# Patient Record
Sex: FEMALE | Race: OTHER | NOT HISPANIC OR LATINO | URBAN - METROPOLITAN AREA
[De-identification: names, ages, dates, MRNs, and addresses within clinical notes are randomized per-mention and may not be internally consistent; named-entity substitution may affect disease eponyms.]

---

## 2018-08-17 ENCOUNTER — EMERGENCY (EMERGENCY)
Facility: HOSPITAL | Age: 28
LOS: 1 days | Discharge: HOME | End: 2018-08-17
Attending: EMERGENCY MEDICINE
Payer: SELF-PAY

## 2018-08-17 VITALS
RESPIRATION RATE: 14 BRPM | HEART RATE: 102 BPM | TEMPERATURE: 98 F | WEIGHT: 149.91 LBS | HEIGHT: 62 IN | OXYGEN SATURATION: 100 % | DIASTOLIC BLOOD PRESSURE: 74 MMHG | SYSTOLIC BLOOD PRESSURE: 113 MMHG

## 2018-08-17 DIAGNOSIS — O26.91 PREGNANCY RELATED CONDITIONS, UNSPECIFIED, FIRST TRIMESTER: ICD-10-CM

## 2018-08-17 LAB
ANION GAP SERPL CALC-SCNC: 9 MMOL/L — SIGNIFICANT CHANGE UP (ref 5–17)
APPEARANCE UR: CLEAR — SIGNIFICANT CHANGE UP
BASOPHILS # BLD AUTO: 0.02 K/UL — SIGNIFICANT CHANGE UP (ref 0–0.2)
BASOPHILS NFR BLD AUTO: 0.3 % — SIGNIFICANT CHANGE UP (ref 0–2)
BILIRUB UR-MCNC: NEGATIVE — SIGNIFICANT CHANGE UP
BUN SERPL-MCNC: 5 MG/DL — LOW (ref 7–23)
CALCIUM SERPL-MCNC: 8.6 MG/DL — SIGNIFICANT CHANGE UP (ref 8.5–10.1)
CHLORIDE SERPL-SCNC: 109 MMOL/L — HIGH (ref 96–108)
CO2 SERPL-SCNC: 21 MMOL/L — LOW (ref 22–31)
COLOR SPEC: YELLOW — SIGNIFICANT CHANGE UP
CREAT SERPL-MCNC: 0.69 MG/DL — SIGNIFICANT CHANGE UP (ref 0.5–1.3)
DIFF PNL FLD: ABNORMAL
EOSINOPHIL # BLD AUTO: 0.02 K/UL — SIGNIFICANT CHANGE UP (ref 0–0.5)
EOSINOPHIL NFR BLD AUTO: 0.3 % — SIGNIFICANT CHANGE UP (ref 0–6)
GLUCOSE SERPL-MCNC: 96 MG/DL — SIGNIFICANT CHANGE UP (ref 70–99)
GLUCOSE UR QL: NEGATIVE — SIGNIFICANT CHANGE UP
HCG SERPL-ACNC: 4557 MIU/ML — HIGH
HCG UR QL: POSITIVE
HCT VFR BLD CALC: 36 % — SIGNIFICANT CHANGE UP (ref 34.5–45)
HCT VFR BLD CALC: 39.9 % — SIGNIFICANT CHANGE UP (ref 34.5–45)
HGB BLD-MCNC: 12.5 G/DL — SIGNIFICANT CHANGE UP (ref 11.5–15.5)
HGB BLD-MCNC: 14 G/DL — SIGNIFICANT CHANGE UP (ref 11.5–15.5)
IMM GRANULOCYTES NFR BLD AUTO: 0.3 % — SIGNIFICANT CHANGE UP (ref 0–1.5)
INR BLD: 1.13 RATIO — SIGNIFICANT CHANGE UP (ref 0.88–1.16)
KETONES UR-MCNC: ABNORMAL
LEUKOCYTE ESTERASE UR-ACNC: NEGATIVE — SIGNIFICANT CHANGE UP
LYMPHOCYTES # BLD AUTO: 1.53 K/UL — SIGNIFICANT CHANGE UP (ref 1–3.3)
LYMPHOCYTES # BLD AUTO: 19.2 % — SIGNIFICANT CHANGE UP (ref 13–44)
MCHC RBC-ENTMCNC: 30.9 PG — SIGNIFICANT CHANGE UP (ref 27–34)
MCHC RBC-ENTMCNC: 31 PG — SIGNIFICANT CHANGE UP (ref 27–34)
MCHC RBC-ENTMCNC: 34.7 GM/DL — SIGNIFICANT CHANGE UP (ref 32–36)
MCHC RBC-ENTMCNC: 35.1 GM/DL — SIGNIFICANT CHANGE UP (ref 32–36)
MCV RBC AUTO: 88.5 FL — SIGNIFICANT CHANGE UP (ref 80–100)
MCV RBC AUTO: 88.9 FL — SIGNIFICANT CHANGE UP (ref 80–100)
MONOCYTES # BLD AUTO: 0.53 K/UL — SIGNIFICANT CHANGE UP (ref 0–0.9)
MONOCYTES NFR BLD AUTO: 6.7 % — SIGNIFICANT CHANGE UP (ref 2–14)
NEUTROPHILS # BLD AUTO: 5.83 K/UL — SIGNIFICANT CHANGE UP (ref 1.8–7.4)
NEUTROPHILS NFR BLD AUTO: 73.2 % — SIGNIFICANT CHANGE UP (ref 43–77)
NITRITE UR-MCNC: NEGATIVE — SIGNIFICANT CHANGE UP
NRBC # BLD: 0 /100 WBCS — SIGNIFICANT CHANGE UP (ref 0–0)
PH UR: 5 — SIGNIFICANT CHANGE UP (ref 5–8)
PLATELET # BLD AUTO: 249 K/UL — SIGNIFICANT CHANGE UP (ref 150–400)
PLATELET # BLD AUTO: 263 K/UL — SIGNIFICANT CHANGE UP (ref 150–400)
POTASSIUM SERPL-MCNC: 3.6 MMOL/L — SIGNIFICANT CHANGE UP (ref 3.5–5.3)
POTASSIUM SERPL-SCNC: 3.6 MMOL/L — SIGNIFICANT CHANGE UP (ref 3.5–5.3)
PROT UR-MCNC: NEGATIVE — SIGNIFICANT CHANGE UP
PROTHROM AB SERPL-ACNC: 12.3 SEC — SIGNIFICANT CHANGE UP (ref 9.8–12.7)
RBC # BLD: 4.05 M/UL — SIGNIFICANT CHANGE UP (ref 3.8–5.2)
RBC # BLD: 4.51 M/UL — SIGNIFICANT CHANGE UP (ref 3.8–5.2)
RBC # FLD: 12.5 % — SIGNIFICANT CHANGE UP (ref 10.3–14.5)
RBC # FLD: 12.6 % — SIGNIFICANT CHANGE UP (ref 10.3–14.5)
SODIUM SERPL-SCNC: 139 MMOL/L — SIGNIFICANT CHANGE UP (ref 135–145)
SP GR SPEC: 1.01 — SIGNIFICANT CHANGE UP (ref 1.01–1.02)
UROBILINOGEN FLD QL: NEGATIVE — SIGNIFICANT CHANGE UP
WBC # BLD: 7.64 K/UL — SIGNIFICANT CHANGE UP (ref 3.8–10.5)
WBC # BLD: 7.95 K/UL — SIGNIFICANT CHANGE UP (ref 3.8–10.5)
WBC # FLD AUTO: 7.64 K/UL — SIGNIFICANT CHANGE UP (ref 3.8–10.5)
WBC # FLD AUTO: 7.95 K/UL — SIGNIFICANT CHANGE UP (ref 3.8–10.5)

## 2018-08-17 PROCEDURE — 99285 EMERGENCY DEPT VISIT HI MDM: CPT

## 2018-08-17 PROCEDURE — 76817 TRANSVAGINAL US OBSTETRIC: CPT | Mod: 26

## 2018-08-17 PROCEDURE — 76801 OB US < 14 WKS SINGLE FETUS: CPT | Mod: 26

## 2018-08-17 RX ORDER — SODIUM CHLORIDE 9 MG/ML
1000 INJECTION INTRAMUSCULAR; INTRAVENOUS; SUBCUTANEOUS ONCE
Qty: 0 | Refills: 0 | Status: COMPLETED | OUTPATIENT
Start: 2018-08-17 | End: 2018-08-17

## 2018-08-17 RX ADMIN — SODIUM CHLORIDE 1000 MILLILITER(S): 9 INJECTION INTRAMUSCULAR; INTRAVENOUS; SUBCUTANEOUS at 14:00

## 2018-08-17 RX ADMIN — SODIUM CHLORIDE 1000 MILLILITER(S): 9 INJECTION INTRAMUSCULAR; INTRAVENOUS; SUBCUTANEOUS at 13:00

## 2018-08-17 NOTE — CONSULT NOTE ADULT - ASSESSMENT
Ms Ralph is emotionally upset but is otherwise clinically stable. Sono does not reveal IUP and there are several small cysts in each ovary. A small to moderate amount of fluid is noted within the pelvis. There is no acute abdomin and pt is stable. HCG is 4557.     IMP:  Pregnancy of unknown location.    Will repeat CBC.   If within acceptable limits, Ms Ralph will be d/c home to F/U in my office on Monday for a repeat sono and QHCG level.  She has been instructed to return to ER if acute abdominal pain or heavy vaginal bleeding develops.    If CBC shows a considerable drop in H/H, she will be admitted of observation.

## 2018-08-17 NOTE — ED PROVIDER NOTE - PROGRESS NOTE DETAILS
The patient states that she passed clots and tissue in the sonogram room at 3:45 third serial  H/H is stable. She is not having pain and is no longer bleeding. The patient states that she passed heavy clots  in the sonogram room, Discussed with Elva, no products noted. S/O Repeat H/H reported  at 3:45am and stable. She is not having pain and is no longer bleeding.

## 2018-08-17 NOTE — ED ADULT NURSE REASSESSMENT NOTE - NS ED NURSE REASSESS COMMENT FT1
Patient received ambulatory from the waiting area c/o vaginal bleeding in pregnancy patient is  patient denies abdominal pain/discomfort, dizziness or shortness of breath. She not had an ultrasound yet confirming IUP. Patient states "bleeding just started" and has not soaked thru an entire pad but states "it felt like I had my period and then I started bleeding.".   Patient is tearful; emotional support provided and likely plan of care discussed with patient w verbal understanding. Patient received ambulatory from the waiting area c/o vaginal bleeding in pregnancy patient is  patient denies abdominal pain/discomfort, dizziness or shortness of breath. She not had an ultrasound yet confirming IUP. Patient states "bleeding just started" and has not soaked thru an entire pad but states "it felt like I had my period and then I started bleeding.".   Patient is tearful; emotional support provided and likely plan of care discussed with patient w verbal understanding. LMP 7/10/2018.

## 2018-08-17 NOTE — ED PROVIDER NOTE - NS ED ROS FT
GEN: no fever, no chills, no weakness  HENT: no eye pain, no visual changes, no ear pain, no visual or hearing changes, no sore throat, no swelling or neck pain  CV: no chest pain, no palpitations, no dizziness, no swelling  RESP: no coughing, no sob, no IWOB, no CRONIN  GI: no abd pain, no distension, no nausea, no vomiting, no diarrhea, no constipation  : no dysuria,  no frequency, no hematuria, no discharge, no flank pain, +vag bleeding   MUSCULOSKELETAL: no myalgia, no arthralgia, no joint swelling, no bruising   SKIN: no rash, no wounds, no itching  NEURO: no change in mentation, no visual changes, no HA, no focal weakness, no trouble speaking, no gait abnormalities, no dizziness  PSYCH: no suidical ideation, no homicidal ideation, no depression, no anxiety, no hallucinations

## 2018-08-17 NOTE — ED PROVIDER NOTE - OBJECTIVE STATEMENT
28yo F , 2 ab, 1 misscarriage LMP 1 month prior p/w pelvic cramping associated w/ vaginal bleeding x today. denies f/c/n/v/d.

## 2018-08-17 NOTE — ED PROVIDER NOTE - PHYSICAL EXAMINATION
GEN: awake, alert, well appearing, NAD   HENT: atraumatic, normocephalic, BRANID, EOMI, no midline instability, oropharynx w/o erythema or exudates, no lymphadenopathy  CV: normal rate and rhythm, S1, S2, no MRG, equal pulses throughout, no JVD  RESP: no distress, no IWOB, no retraction, clear to auscultation bilaterally   ABD: soft, nontender, nondistended, no rebound, no guarding, normoactive bowel sounds, no organomegally  MUSCULOSKELETAL: strenght 5/5 x 4, full range of motion, CMS intact   SKIN: normal color, no turgor, no wounds or rash   NEURO: Awake alert oriented x 3, no facial asymmetry, no slurred speech, no pronator drift, moving all extremities  PSYCH: no suicial ideation, no homicidal ideation, no depression, no anxiety, no hallucination

## 2018-08-17 NOTE — CONSULT NOTE ADULT - SUBJECTIVE AND OBJECTIVE BOX
BRII MARTINEZ is a 27y  Female           Last Menstrual Period: 7/10/2018    HPI:  Patient is a 27y old  Female who presents with a chief complaint of lower abdominal cramps and the onset of heavy vaginal bleeding that began today while she was a work. Pt states bleeding was heavy with the passage of large clots. Up until today she was feeling well. She denies nausea, vomiting, diarrhea, fever or chills.      MEDICATIONS: none    Allergies: NKA    PAST MEDICAL & SURGICAL HISTORY:  No pertinent past medical history      OB/GYN HISTORY:                                           LMP 7/10/2018                                        2 TOP and 1 mischarriage                                            FAMILY HISTORY: non-contributory      SOCIAL HISTORY: nonsmoker, occasional drink, no drugs    REVIEW OF SYSTEMS: AS per HPI  	  Physical Exam:    Vital Signs Last 24 Hrs  T(C): 37.2 (17 Aug 2018 17:02), Max: 37.2 (17 Aug 2018 17:02)  T(F): 99 (17 Aug 2018 17:02), Max: 99 (17 Aug 2018 17:02)  HR: 98 (17 Aug 2018 17:02) (97 - 102)  BP: 107/73 (17 Aug 2018 17:02) (103/67 - 113/74)  BP(mean): --  RR: 17 (17 Aug 2018 17:02) (14 - 17)  SpO2: 98% (17 Aug 2018 17:02) (98% - 100%)    Height (cm): 157.48 (18 @ 12:41)  Weight (kg): 68 (18 @ 12:41)  BMI (kg/m2): 27.4 (18 @ 12:41)  BSA (m2): 1.69 (18 @ 12:41)    PHYSICAL EXAM:      Constitutional:  Young W/F W/D W/N in NAD but weepy    HEENT: N/C atraumatic neck supple    Respiratory: clear    Cardiovascular: RR    Abdomin: soft, N/T to deep palpation, no rebound     Genitourinary:  Normal female genitalia, small amount of blood in the vaginal vault, cervix L/C/P                        no adnexal masses were palpable    Extremities: negative clubbing, cyanosis or edema    Neurological: grossly intact    Skin: smooth, warm and dry    Psychiatric: Patient is weepy and clearly upset about the possibility of mischarriage.        LABS:  HCG Quantitative, Serum: 4557 mIU/mL                          14.0   7.95  )-----------( 263      ( 17 Aug 2018 13:04 )             39.9     -    139  |  109<H>  |  5<L>  ----------------------------<  96  3.6   |  21<L>  |  0.69    Ca    8.6      17 Aug 2018 13:04      PT/INR - ( 17 Aug 2018 13:04 )   PT: 12.3 sec;   INR: 1.13 ratio             Urinalysis Basic - ( 17 Aug 2018 16:23 )    Color: Yellow / Appearance: Clear / S.010 / pH: x  Gluc: x / Ketone: Large  / Bili: Negative / Urobili: Negative   Blood: x / Protein: Negative / Nitrite: Negative   Leuk Esterase: Negative / RBC: >50 /HPF / WBC 0-2   Sq Epi: x / Non Sq Epi: Few / Bacteria: Few      Blood type: ABO RH Interpretation: A POS (18 @ 13:04)      RADIOLOGY & ADDITIONAL STUDIES:< from: US Echo OB <=14 Weeks, First Gestation (18 @ 15:23) >  TECHNIQUE: Transabdominal and transvaginal ultrasonography examination   was performed with grayscale and color imaging.    FINDINGS:    Uterus: 7.1 x 3.8 x 3.6 cm. No intrauterine gestational sac is   visualized. Endometrial thickness measures 1.1 cm and is mildly thickened.    Gestational Sac Size (mean): n/a    Crown Rump Length: n/a     Estimated Gestational Age: n/a    Yolk Sac: n/a.    Fetal Heart Rate: n/a    Right ovary: 4.3 x 2.2 x 2.4 cm. Within the right ovary, there is a   simple appearing cyst which measures 1.1 x 1.2 x 1.0 cm. Adjacent to this   cyst is an additional corpus luteal cyst which measures 1.9 x 1.3 cm.   Arterial and venous waveforms are obtained within the right ovary.    Left ovary: 3.0 x 2.2 x 1.5 cm. There is a 1.0 x 1.0 x 1.3 cm simple   appearing paraovarian cyst. Arterial venous waveforms are obtained within   the left ovary.    Fluid: A small to moderate amount of complex pelvic free fluid is   notable. There is also a small to moderate amount of complex free fluid   adjacent to the bilateral adnexa.    IMPRESSION:     1. Pregnancy of unknown location.  2. An intrauterine pregnancy is not confirmed on this examination.  3. An ectopic pregnancy, although, not visualized cannot be completely   excluded.  4. Given the amount of small to moderate amount of complex free fluid   within the pelvis, a ruptured ectopic pregnancy cannot be completely   excluded. An alternative consideration is that this may be secondary to a   ruptured hemorrhagic cyst.    < end of copied text >

## 2018-08-18 VITALS
RESPIRATION RATE: 16 BRPM | DIASTOLIC BLOOD PRESSURE: 64 MMHG | OXYGEN SATURATION: 97 % | SYSTOLIC BLOOD PRESSURE: 98 MMHG | TEMPERATURE: 98 F | HEART RATE: 88 BPM

## 2018-08-18 LAB
HCT VFR BLD CALC: 34.9 % — SIGNIFICANT CHANGE UP (ref 34.5–45)
HGB BLD-MCNC: 12.1 G/DL — SIGNIFICANT CHANGE UP (ref 11.5–15.5)
MCHC RBC-ENTMCNC: 31 PG — SIGNIFICANT CHANGE UP (ref 27–34)
MCHC RBC-ENTMCNC: 34.7 GM/DL — SIGNIFICANT CHANGE UP (ref 32–36)
MCV RBC AUTO: 89.5 FL — SIGNIFICANT CHANGE UP (ref 80–100)
NRBC # BLD: 0 /100 WBCS — SIGNIFICANT CHANGE UP (ref 0–0)
PLATELET # BLD AUTO: 217 K/UL — SIGNIFICANT CHANGE UP (ref 150–400)
RBC # BLD: 3.9 M/UL — SIGNIFICANT CHANGE UP (ref 3.8–5.2)
RBC # FLD: 12.6 % — SIGNIFICANT CHANGE UP (ref 10.3–14.5)
WBC # BLD: 6.44 K/UL — SIGNIFICANT CHANGE UP (ref 3.8–10.5)
WBC # FLD AUTO: 6.44 K/UL — SIGNIFICANT CHANGE UP (ref 3.8–10.5)

## 2018-08-18 PROCEDURE — 86900 BLOOD TYPING SEROLOGIC ABO: CPT

## 2018-08-18 PROCEDURE — 36415 COLL VENOUS BLD VENIPUNCTURE: CPT

## 2018-08-18 PROCEDURE — 85610 PROTHROMBIN TIME: CPT

## 2018-08-18 PROCEDURE — 81025 URINE PREGNANCY TEST: CPT

## 2018-08-18 PROCEDURE — 86901 BLOOD TYPING SEROLOGIC RH(D): CPT

## 2018-08-18 PROCEDURE — 99284 EMERGENCY DEPT VISIT MOD MDM: CPT | Mod: 25

## 2018-08-18 PROCEDURE — 80048 BASIC METABOLIC PNL TOTAL CA: CPT

## 2018-08-18 PROCEDURE — 84702 CHORIONIC GONADOTROPIN TEST: CPT

## 2018-08-18 PROCEDURE — 81001 URINALYSIS AUTO W/SCOPE: CPT

## 2018-08-18 PROCEDURE — 86850 RBC ANTIBODY SCREEN: CPT

## 2018-08-18 PROCEDURE — 76817 TRANSVAGINAL US OBSTETRIC: CPT

## 2018-08-18 PROCEDURE — 85027 COMPLETE CBC AUTOMATED: CPT

## 2018-08-18 PROCEDURE — 76801 OB US < 14 WKS SINGLE FETUS: CPT

## 2018-08-18 NOTE — ED ADULT NURSE REASSESSMENT NOTE - NS ED NURSE REASSESS COMMENT FT1
Pt informed MD that earlier this evening, after first U/S, she had passed what she felt were products of conception. She left it in the bathroom in radiology and informed an unknown person in that department.  Pt further states she informed the ED attending and Gyn attending of event. Pt states she has no further bleeding, and denies pain or discomfort at this time.

## 2018-08-22 ENCOUNTER — INPATIENT (INPATIENT)
Facility: HOSPITAL | Age: 28
LOS: 0 days | Discharge: HOME | DRG: 745 | End: 2018-08-23
Payer: SELF-PAY

## 2018-08-22 VITALS
DIASTOLIC BLOOD PRESSURE: 60 MMHG | WEIGHT: 149.91 LBS | TEMPERATURE: 99 F | HEART RATE: 100 BPM | HEIGHT: 63 IN | OXYGEN SATURATION: 100 % | RESPIRATION RATE: 16 BRPM | SYSTOLIC BLOOD PRESSURE: 101 MMHG

## 2018-08-22 DIAGNOSIS — R10.9 UNSPECIFIED ABDOMINAL PAIN: ICD-10-CM

## 2018-08-22 LAB
ALBUMIN SERPL ELPH-MCNC: 3.5 G/DL — SIGNIFICANT CHANGE UP (ref 3.3–5)
ALP SERPL-CCNC: 40 U/L — SIGNIFICANT CHANGE UP (ref 40–120)
ALT FLD-CCNC: 17 U/L — SIGNIFICANT CHANGE UP (ref 12–78)
ANION GAP SERPL CALC-SCNC: 6 MMOL/L — SIGNIFICANT CHANGE UP (ref 5–17)
APTT BLD: 28.6 SEC — SIGNIFICANT CHANGE UP (ref 27.5–37.4)
AST SERPL-CCNC: 11 U/L — LOW (ref 15–37)
BASOPHILS # BLD AUTO: 0 K/UL — SIGNIFICANT CHANGE UP (ref 0–0.2)
BASOPHILS NFR BLD AUTO: 0 % — SIGNIFICANT CHANGE UP (ref 0–2)
BILIRUB SERPL-MCNC: 0.2 MG/DL — SIGNIFICANT CHANGE UP (ref 0.2–1.2)
BUN SERPL-MCNC: 8 MG/DL — SIGNIFICANT CHANGE UP (ref 7–23)
CALCIUM SERPL-MCNC: 8.5 MG/DL — SIGNIFICANT CHANGE UP (ref 8.5–10.1)
CHLORIDE SERPL-SCNC: 110 MMOL/L — HIGH (ref 96–108)
CO2 SERPL-SCNC: 27 MMOL/L — SIGNIFICANT CHANGE UP (ref 22–31)
CREAT SERPL-MCNC: 0.68 MG/DL — SIGNIFICANT CHANGE UP (ref 0.5–1.3)
EOSINOPHIL # BLD AUTO: 0.08 K/UL — SIGNIFICANT CHANGE UP (ref 0–0.5)
EOSINOPHIL NFR BLD AUTO: 1 % — SIGNIFICANT CHANGE UP (ref 0–6)
GLUCOSE SERPL-MCNC: 86 MG/DL — SIGNIFICANT CHANGE UP (ref 70–99)
HCG SERPL-ACNC: 1282 MIU/ML — HIGH
HCT VFR BLD CALC: 29 % — LOW (ref 34.5–45)
HGB BLD-MCNC: 9.8 G/DL — LOW (ref 11.5–15.5)
INR BLD: 1.13 RATIO — SIGNIFICANT CHANGE UP (ref 0.88–1.16)
LIDOCAIN IGE QN: 153 U/L — SIGNIFICANT CHANGE UP (ref 73–393)
LYMPHOCYTES # BLD AUTO: 3.01 K/UL — SIGNIFICANT CHANGE UP (ref 1–3.3)
LYMPHOCYTES # BLD AUTO: 38 % — SIGNIFICANT CHANGE UP (ref 13–44)
MCHC RBC-ENTMCNC: 30.4 PG — SIGNIFICANT CHANGE UP (ref 27–34)
MCHC RBC-ENTMCNC: 33.8 GM/DL — SIGNIFICANT CHANGE UP (ref 32–36)
MCV RBC AUTO: 90.1 FL — SIGNIFICANT CHANGE UP (ref 80–100)
MONOCYTES # BLD AUTO: 0.4 K/UL — SIGNIFICANT CHANGE UP (ref 0–0.9)
MONOCYTES NFR BLD AUTO: 5 % — SIGNIFICANT CHANGE UP (ref 2–14)
NEUTROPHILS # BLD AUTO: 3.96 K/UL — SIGNIFICANT CHANGE UP (ref 1.8–7.4)
NEUTROPHILS NFR BLD AUTO: 50 % — SIGNIFICANT CHANGE UP (ref 43–77)
PLATELET # BLD AUTO: 268 K/UL — SIGNIFICANT CHANGE UP (ref 150–400)
POTASSIUM SERPL-MCNC: 3.6 MMOL/L — SIGNIFICANT CHANGE UP (ref 3.5–5.3)
POTASSIUM SERPL-SCNC: 3.6 MMOL/L — SIGNIFICANT CHANGE UP (ref 3.5–5.3)
PROT SERPL-MCNC: 6.7 G/DL — SIGNIFICANT CHANGE UP (ref 6–8.3)
PROTHROM AB SERPL-ACNC: 12.3 SEC — SIGNIFICANT CHANGE UP (ref 9.8–12.7)
RBC # BLD: 3.22 M/UL — LOW (ref 3.8–5.2)
RBC # FLD: 12.4 % — SIGNIFICANT CHANGE UP (ref 10.3–14.5)
SODIUM SERPL-SCNC: 143 MMOL/L — SIGNIFICANT CHANGE UP (ref 135–145)
WBC # BLD: 7.92 K/UL — SIGNIFICANT CHANGE UP (ref 3.8–10.5)
WBC # FLD AUTO: 7.92 K/UL — SIGNIFICANT CHANGE UP (ref 3.8–10.5)

## 2018-08-22 PROCEDURE — 99284 EMERGENCY DEPT VISIT MOD MDM: CPT

## 2018-08-22 PROCEDURE — 76830 TRANSVAGINAL US NON-OB: CPT | Mod: 26

## 2018-08-22 PROCEDURE — 76705 ECHO EXAM OF ABDOMEN: CPT | Mod: 26

## 2018-08-22 RX ORDER — FAMOTIDINE 10 MG/ML
20 INJECTION INTRAVENOUS ONCE
Qty: 0 | Refills: 0 | Status: COMPLETED | OUTPATIENT
Start: 2018-08-22 | End: 2018-08-22

## 2018-08-22 RX ORDER — MORPHINE SULFATE 50 MG/1
4 CAPSULE, EXTENDED RELEASE ORAL ONCE
Qty: 0 | Refills: 0 | Status: DISCONTINUED | OUTPATIENT
Start: 2018-08-22 | End: 2018-08-22

## 2018-08-22 RX ORDER — SODIUM CHLORIDE 9 MG/ML
1000 INJECTION INTRAMUSCULAR; INTRAVENOUS; SUBCUTANEOUS ONCE
Qty: 0 | Refills: 0 | Status: COMPLETED | OUTPATIENT
Start: 2018-08-22 | End: 2018-08-22

## 2018-08-22 RX ORDER — LIDOCAINE 4 G/100G
10 CREAM TOPICAL ONCE
Qty: 0 | Refills: 0 | Status: COMPLETED | OUTPATIENT
Start: 2018-08-22 | End: 2018-08-22

## 2018-08-22 RX ADMIN — SODIUM CHLORIDE 1000 MILLILITER(S): 9 INJECTION INTRAMUSCULAR; INTRAVENOUS; SUBCUTANEOUS at 20:44

## 2018-08-22 RX ADMIN — MORPHINE SULFATE 4 MILLIGRAM(S): 50 CAPSULE, EXTENDED RELEASE ORAL at 23:06

## 2018-08-22 RX ADMIN — MORPHINE SULFATE 4 MILLIGRAM(S): 50 CAPSULE, EXTENDED RELEASE ORAL at 20:55

## 2018-08-22 RX ADMIN — SODIUM CHLORIDE 1000 MILLILITER(S): 9 INJECTION INTRAMUSCULAR; INTRAVENOUS; SUBCUTANEOUS at 23:06

## 2018-08-22 RX ADMIN — Medication 30 MILLILITER(S): at 23:10

## 2018-08-22 RX ADMIN — FAMOTIDINE 20 MILLIGRAM(S): 10 INJECTION INTRAVENOUS at 20:44

## 2018-08-22 RX ADMIN — LIDOCAINE 10 MILLILITER(S): 4 CREAM TOPICAL at 23:10

## 2018-08-22 NOTE — H&P ADULT - NSHPPHYSICALEXAM_GEN_ALL_CORE
Young white female looking uncomfortable  HEENT; wnl  neck: supple  ht RR  lungs clear  abdomin: soft, tender, BS +, no significant rebound  ext -CCE  genitalia: normal female                cervix L/C/P -CMT                uterus: anteverted small n/t                adnexal slightly tender left>right                vagina; blood tinged

## 2018-08-22 NOTE — ED ADULT TRIAGE NOTE - CHIEF COMPLAINT QUOTE
miscarriage on Friday, was at follow up OBGYN appointment MD Paez. referred to ED by GYN for upper abdominal pain and lightheadedness. denies vaginal bleeding

## 2018-08-22 NOTE — H&P ADULT - ASSESSMENT
persistent abdominal pain with + pregnancy test and vaginal bleeding  Admit for diagnostic laparoscopy, possible salpingectomy, possible ovarian cystectony, possible oophorectomy

## 2018-08-22 NOTE — H&P ADULT - HISTORY OF PRESENT ILLNESS
Pt is a 26 y/o W/F  LMP 7/10/18 with abdominal pain and +HCG. She is admitted for laparoscopy to r/o ectopic gestation. Ms Augustine was originally seen on 18 when she presented to the ED with c/o lower abdominal cramps and the onset of heavy vaginal bleeding with clots at work. QHCG was positive 4557 and sono did not see IUP and right ovary had a 1.1x 1.2x 1.0 simple cyst and 1.9x 1.3 corpues luteal cyst with normal dopplers.  The lft ovary had a 1.0x 1.0x 1.3 cm simple cyst ? paraovarian cyst. There was a small to moderte amount of fluid in the pelvis. Here abdomin was no acute and H/H was stable after several hours. Pelvic exam was unremarkable except for moderate amount of vaginal bleeding. Probable spontaneous miscarrriage was diagnosed but the possibility of ectopic. Patient was stable and relatively comfortable. She was discharged and told to follow in the office.  She was seen in the office on 18. She c/o of heavy vaginal bleeding throughout the day. On exam there were several large clots. These were removed and bleeding was monitored from the cervix and no hemorrage was appreciated and the cervix was closed. Repeat sono noted no free fluid and no IUP. The was a small 1.2 cm clear cyst on the left ? paraovarian. The endometrium measured 8mm( was 12mm on sono ).  Ms Ralph was given Cytotec to control vaginal bleeding and percocet(#10) for pain and methergine. Ms Augustine stated that bleeding had slowed down and was mild by the next day (). This afternoon pt called c/o headache and feeling faint. She was told to right over to the office or ER for evaluation.  she present to the office on the day of admission. She was tachycardic and c/o epigastric and lower abdominal pain.. Vaginal bleeding was scant and was nondistended with slight tenderness in both lower quadrants no rebound. She had tenderness in the epigastric area.  She was sent to [facility] for blood work and abdominal/pelvic sono.   H/H was 9.8/29.0 (14/39.9) and the QHCG [id number] (4557). Sono of the gallbadder was normal and pelvic sono noted no fluid in the abdomin. No IUP 1.7 cm   corpus luteum and 1.3x 1.7x 1.5 cm left paraovarian cyst. The endometrium measured 7mm.  Ms Augustine c/o continuous abdominal pain that required 4 mg of morphine.

## 2018-08-22 NOTE — ED ADULT NURSE REASSESSMENT NOTE - NS ED NURSE REASSESS COMMENT FT1
patient seen by Dr Paez in ED, going to OR when surgical staff ready. Preop checklist complete, patient giving all property to family member for safe keeping. Ambulating to bathroom at this time without difficulty.

## 2018-08-22 NOTE — ED PROVIDER NOTE - OBJECTIVE STATEMENT
28 yo female  LMP 1 month ago seen in ED 5 days ago, dx'ed with miscarriage, f/u with ob/gyn Dr. Paez, seen in office on Monday and today, given methergine, bleeding now stopped, sent in by Dr. Paez c/o upper and lower abdominal pain.  No fever/chills.  No chest pain, no shortness of breath. 26 yo female  LMP 1 month ago seen in ED 5 days ago, dx'ed with miscarriage, f/u with ob/gyn Dr. Paez, seen in office on Monday and today, given methergine, bleeding now stopped, sent in by Dr. Paez c/o upper and lower abdominal pain.  +generalized weakness. No fever/chills.  No chest pain, no shortness of breath.

## 2018-08-22 NOTE — ED PROVIDER NOTE - NS ED ROS FT

## 2018-08-22 NOTE — ED ADULT NURSE REASSESSMENT NOTE - NS ED NURSE REASSESS COMMENT FT1
c/o epigastric 9 on( 0 to 10 )scale.  medicated with MS 4mg IV with relief of symptoms.  waiting for ultrasound

## 2018-08-22 NOTE — H&P ADULT - NSHPLABSRESULTS_GEN_ALL_CORE
< from: US Transvaginal (08.22.18 @ 21:59) >    Uterus: 5.2 x 4.0 x 4.5 cm. No IUP  Endometrium: 7 mm.   Right ovary: 3.4 x 1.7 x 2.5 cm. 1.7 cm probable corpus luteum. Flow is   preserved  Left ovary: 4.3 x 2.5 x 2.0 cm. 1.3 x 1.7 x 1.5 cm paraovarian cyst. Flow   is preserved  Free fluid: Previously seen fluid is no longer appreciated    IMPRESSION:    Pregnancy of unknown location as an IUP is not visualized. Failed   pregnancy and ruptured/unruptured ectopic pregnancy remain on the   differential given down trending beta hCG. Continued follow-up recommended      < end of copied text >    < from: US Gallbladder (08.22.18 @ 21:52) >      No gallstones, wall thickening or pericholecystic fluid is seen. The CBD   measures 4 to 5 mm.    IMPRESSION:    No gallstones or wall thickening      < end of copied text >

## 2018-08-22 NOTE — ED PROVIDER NOTE - PHYSICAL EXAMINATION
Gen: Alert, NAD  Head/eyes: NC/AT, PERRL, EOMI, normal lids/conjunctiva, no scleral icterus  ENT: Bilateral TM WNL, normal hearing, patent oropharynx without erythema/exudate, uvula midline, no peritonsillar abscess, no tongue/uvula swelling  Neck: supple, no tenderness/meningismus/JVD, Trachea midline  Pulm: Bilateral clear BS, normal resp effort, no wheeze/stridor/retractions  CV: RRR, no M/R/G, +2 dist pulses (radial, pedal DP/PT, popliteal)  Abd: soft, +ttp epigastric, RLQ and LLQ/ND, +BS, no guarding/rebound tenderness  Musculoskeletal: no edema/erythema/cyanosis, FROM in all extremities, no C/T/L spine ttp  Skin: no rash, no vesicles, no petechaie, no ecchymosis, no swelling  Neuro: AAOx3, CN 2-12 intact, normal sensation, 5/5 motor strength in all extremities, normal gait, no dysmetria

## 2018-08-22 NOTE — ED PROVIDER NOTE - PROGRESS NOTE DETAILS
still c/o abd pain, labs and imaging explained to Dr. Paez, will see patient in ED discussed case with Dr. Paez, will take patient to OR for diagnostic laparoscopy

## 2018-08-22 NOTE — ED ADULT NURSE NOTE - OBJECTIVE STATEMENT
c/o epigastric pain since afternoon.  reports extreme weakness s/p miscarriage with vaginal bleedeing

## 2018-08-23 VITALS
RESPIRATION RATE: 16 BRPM | DIASTOLIC BLOOD PRESSURE: 56 MMHG | OXYGEN SATURATION: 95 % | SYSTOLIC BLOOD PRESSURE: 93 MMHG | TEMPERATURE: 98 F | HEART RATE: 95 BPM

## 2018-08-23 DIAGNOSIS — O00.90 UNSPECIFIED ECTOPIC PREGNANCY WITHOUT INTRAUTERINE PREGNANCY: ICD-10-CM

## 2018-08-23 DIAGNOSIS — N83.8 OTHER NONINFLAMMATORY DISORDERS OF OVARY, FALLOPIAN TUBE AND BROAD LIGAMENT: ICD-10-CM

## 2018-08-23 DIAGNOSIS — R10.84 GENERALIZED ABDOMINAL PAIN: ICD-10-CM

## 2018-08-23 PROCEDURE — 86900 BLOOD TYPING SEROLOGIC ABO: CPT

## 2018-08-23 PROCEDURE — 80053 COMPREHEN METABOLIC PANEL: CPT

## 2018-08-23 PROCEDURE — 86850 RBC ANTIBODY SCREEN: CPT

## 2018-08-23 PROCEDURE — 86901 BLOOD TYPING SEROLOGIC RH(D): CPT

## 2018-08-23 PROCEDURE — 83690 ASSAY OF LIPASE: CPT

## 2018-08-23 PROCEDURE — 99285 EMERGENCY DEPT VISIT HI MDM: CPT | Mod: 25

## 2018-08-23 PROCEDURE — 76705 ECHO EXAM OF ABDOMEN: CPT

## 2018-08-23 PROCEDURE — 85027 COMPLETE CBC AUTOMATED: CPT

## 2018-08-23 PROCEDURE — 36415 COLL VENOUS BLD VENIPUNCTURE: CPT

## 2018-08-23 PROCEDURE — 85730 THROMBOPLASTIN TIME PARTIAL: CPT

## 2018-08-23 PROCEDURE — 96374 THER/PROPH/DIAG INJ IV PUSH: CPT

## 2018-08-23 PROCEDURE — 96376 TX/PRO/DX INJ SAME DRUG ADON: CPT

## 2018-08-23 PROCEDURE — 76830 TRANSVAGINAL US NON-OB: CPT

## 2018-08-23 PROCEDURE — 84702 CHORIONIC GONADOTROPIN TEST: CPT

## 2018-08-23 PROCEDURE — 85610 PROTHROMBIN TIME: CPT

## 2018-08-23 PROCEDURE — 88108 CYTOPATH CONCENTRATE TECH: CPT | Mod: 26

## 2018-08-23 PROCEDURE — 88108 CYTOPATH CONCENTRATE TECH: CPT

## 2018-08-23 RX ORDER — DOCUSATE SODIUM 100 MG
100 CAPSULE ORAL THREE TIMES A DAY
Qty: 0 | Refills: 0 | Status: DISCONTINUED | OUTPATIENT
Start: 2018-08-23 | End: 2018-08-23

## 2018-08-23 RX ORDER — METOCLOPRAMIDE HCL 10 MG
5 TABLET ORAL ONCE
Qty: 0 | Refills: 0 | Status: COMPLETED | OUTPATIENT
Start: 2018-08-23 | End: 2018-08-23

## 2018-08-23 RX ORDER — DOCUSATE SODIUM 100 MG
1 CAPSULE ORAL
Qty: 0 | Refills: 0 | COMMUNITY
Start: 2018-08-23

## 2018-08-23 RX ORDER — ACETAMINOPHEN 500 MG
325 TABLET ORAL
Qty: 0 | Refills: 0 | COMMUNITY

## 2018-08-23 RX ORDER — KETOROLAC TROMETHAMINE 30 MG/ML
30 SYRINGE (ML) INJECTION EVERY 6 HOURS
Qty: 0 | Refills: 0 | Status: DISCONTINUED | OUTPATIENT
Start: 2018-08-23 | End: 2018-08-23

## 2018-08-23 RX ORDER — IBUPROFEN 200 MG
3 TABLET ORAL
Qty: 0 | Refills: 0 | COMMUNITY

## 2018-08-23 RX ORDER — OXYCODONE AND ACETAMINOPHEN 5; 325 MG/1; MG/1
1 TABLET ORAL EVERY 4 HOURS
Qty: 0 | Refills: 0 | Status: DISCONTINUED | OUTPATIENT
Start: 2018-08-23 | End: 2018-08-23

## 2018-08-23 RX ORDER — CEFAZOLIN SODIUM 1 G
2000 VIAL (EA) INJECTION ONCE
Qty: 0 | Refills: 0 | Status: DISCONTINUED | OUTPATIENT
Start: 2018-08-23 | End: 2018-08-23

## 2018-08-23 RX ORDER — SODIUM CHLORIDE 9 MG/ML
1000 INJECTION, SOLUTION INTRAVENOUS
Qty: 0 | Refills: 0 | Status: DISCONTINUED | OUTPATIENT
Start: 2018-08-23 | End: 2018-08-23

## 2018-08-23 RX ORDER — OXYCODONE HYDROCHLORIDE 5 MG/1
5 TABLET ORAL ONCE
Qty: 0 | Refills: 0 | Status: DISCONTINUED | OUTPATIENT
Start: 2018-08-23 | End: 2018-08-23

## 2018-08-23 RX ORDER — ONDANSETRON 8 MG/1
4 TABLET, FILM COATED ORAL EVERY 6 HOURS
Qty: 0 | Refills: 0 | Status: DISCONTINUED | OUTPATIENT
Start: 2018-08-23 | End: 2018-08-23

## 2018-08-23 RX ORDER — HYDROMORPHONE HYDROCHLORIDE 2 MG/ML
0.5 INJECTION INTRAMUSCULAR; INTRAVENOUS; SUBCUTANEOUS
Qty: 0 | Refills: 0 | Status: DISCONTINUED | OUTPATIENT
Start: 2018-08-23 | End: 2018-08-23

## 2018-08-23 RX ADMIN — HYDROMORPHONE HYDROCHLORIDE 0.5 MILLIGRAM(S): 2 INJECTION INTRAMUSCULAR; INTRAVENOUS; SUBCUTANEOUS at 02:45

## 2018-08-23 RX ADMIN — Medication 5 MILLIGRAM(S): at 02:20

## 2018-08-23 RX ADMIN — OXYCODONE AND ACETAMINOPHEN 1 TABLET(S): 5; 325 TABLET ORAL at 08:40

## 2018-08-23 RX ADMIN — SODIUM CHLORIDE 110 MILLILITER(S): 9 INJECTION, SOLUTION INTRAVENOUS at 02:18

## 2018-08-23 RX ADMIN — HYDROMORPHONE HYDROCHLORIDE 0.5 MILLIGRAM(S): 2 INJECTION INTRAMUSCULAR; INTRAVENOUS; SUBCUTANEOUS at 02:30

## 2018-08-23 NOTE — DISCHARGE NOTE ADULT - PATIENT PORTAL LINK FT
You can access the [email or web address], offered by [facility], by registering with the following website: [email or web address]

## 2018-08-23 NOTE — PROGRESS NOTE ADULT - SUBJECTIVE AND OBJECTIVE BOX
INTERVAL HPI/OVERNIGHT EVENTS:  HD # 1     Pt seen and examined at bedside. A 27y, Female   Pt complains of some abdominal discomfort but otherwise is doing well   Ms BRII SARABIA is comfortable on present pain meds.    PAST MEDICAL & SURGICAL HISTORY:  No pertinent past medical history  No significant past surgical history      MEDICATIONS  (STANDING):    MEDICATIONS  (PRN):  aluminum hydroxide/magnesium hydroxide/simethicone Suspension 30 milliLiter(s) Oral every 4 hours PRN Dyspepsia  docusate sodium 100 milliGRAM(s) Oral three times a day PRN Stool Softening  ketorolac   Injectable 30 milliGRAM(s) IV Push every 6 hours PRN Moderate Pain  ondansetron Injectable 4 milliGRAM(s) IV Push every 6 hours PRN Postoperative Nausea and/or Vomiting  oxyCODONE    5 mG/acetaminophen 325 mG 1 Tablet(s) Oral every 4 hours PRN Moderate Pain      Allergies    No Known Allergies    Intolerances    Vital Signs Last 24 Hrs  T(C): 36.7 (23 Aug 2018 05:00), Max: 37.3 (22 Aug 2018 20:02)  T(F): 98.1 (23 Aug 2018 05:00), Max: 99.2 (22 Aug 2018 20:02)  HR: 95 (23 Aug 2018 05:00) (74 - 106)  BP: 93/56 (23 Aug 2018 05:00) (90/59 - 117/65)  BP(mean): --  RR: 16 (23 Aug 2018 05:00) (14 - 19)  SpO2: 95% (23 Aug 2018 05:00) (94% - 100%)  I&O's Detail    22 Aug 2018 07:01  -  23 Aug 2018 07:00  --------------------------------------------------------  IN:    lactated ringers.: 550 mL  Total IN: 550 mL    OUT:  Total OUT: 0 mL    Total NET: 550 mL        I&O's Summary    22 Aug 2018 07:01  -  23 Aug 2018 07:00  --------------------------------------------------------  IN: 550 mL / OUT: 0 mL / NET: 550 mL      PHYSICAL EXAM:    General Appearence: W /F, in NAD, A+0 x 3  Cardiac: RRR  Pulmonary: CTA BL  Breasts: soft, nontender, no palpable masses  Abdomin: ( + ) BS, soft, nontender, nondistended, no rebound or guarding, ( - ) BM  Incision: clean, dry and intact  Vaginal bleeding is scant  Extremities: no swelling or calf tenderness, reflexes +2 bilaterally      LABS:                        9.8    7.92  )-----------( 268      ( 22 Aug 2018 20:40 )             29.0     08-22    143  |  110<H>  |  8   ----------------------------<  86  3.6   |  27  |  0.68    Ca    8.5      22 Aug 2018 20:40    TPro  6.7  /  Alb  3.5  /  TBili  0.2  /  DBili  x   /  AST  11<L>  /  ALT  17  /  AlkPhos  40  08-22    PT/INR - ( 22 Aug 2018 20:40 )   PT: 12.3 sec;   INR: 1.13 ratio         PTT - ( 22 Aug 2018 20:40 )  PTT:28.6 sec

## 2018-08-23 NOTE — BRIEF OPERATIVE NOTE - PRE-OP DX
Lower abdominal pain  08/23/2018    Active  Jacqui Paez  Tubal ectopic pregnancy, unspecified laterality, unspecified whether intrauterine pregnancy present  08/23/2018    Active  Jacqui Paez

## 2018-08-23 NOTE — DISCHARGE NOTE ADULT - CARE PLAN
Principal Discharge DX:	Generalized abdominal pain  Goal:	diagnostic laparoscopy  Assessment and plan of treatment:	aspirartion of cyst  f/u in the offfice in one week  Secondary Diagnosis:	Paratubal cyst  Goal:	aspiration of

## 2018-08-23 NOTE — BRIEF OPERATIVE NOTE - OPERATION/FINDINGS
Normal size uterus, normal tubes and ovaries, 1.5 cm clear paratubal cyst on the left which was aspirated  ~5 cc of dark red blood in the culdesac  normal appendix, and intestines, liver and intra abdominal organs.

## 2018-08-23 NOTE — BRIEF OPERATIVE NOTE - PROCEDURE
<<-----Click on this checkbox to enter Procedure Aspiration, cyst  08/23/2018  left paratubal cyst  Active  KRISTINA  Laparoscopy and peritoneal lavage  08/23/2018    Active  KRISTINA

## 2018-08-23 NOTE — DISCHARGE NOTE ADULT - CARE PROVIDER_API CALL
Jacqui Paez), Obstetrics and Gynecology  [address] [place] [address]  Phone: [phone]  Fax: [phone]

## 2018-08-23 NOTE — DISCHARGE NOTE ADULT - HOSPITAL COURSE
28 y/o W/F  admitted with abdominal pain and positive pregnancy test and unknown location. Diagnostic laparoscopy. Small left paratubal cyst noted and aspirated.  no complications

## 2018-08-23 NOTE — DISCHARGE NOTE ADULT - MEDICATION SUMMARY - MEDICATIONS TO TAKE
I will START or STAY ON the medications listed below when I get home from the hospital:    oxyCODONE-acetaminophen 5 mg-325 mg oral tablet  -- 1 tab(s) by mouth every 4 hours, As needed, Moderate Pain  -- Indication: For sever pain    ibuprofen 200 mg oral tablet  -- 3 tab(s) by mouth every 6 hours  -- Indication: For moderate pain    Tylenol  -- 325 milligram(s) by mouth every 6 hours  -- Indication: For mild pain    docusate sodium 100 mg oral capsule  -- 1 cap(s) by mouth 3 times a day, As needed, Stool Softening  -- Indication: For stool softner

## 2018-08-24 LAB — NON-GYN CYTOLOGY SPEC: SIGNIFICANT CHANGE UP

## 2020-07-29 NOTE — PRE-OP CHECKLIST - AS TEMP SITE
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 She just needs a note explaining why she was off work. Please mail note to pt once complete.    oral

## 2021-11-01 NOTE — ED ADULT TRIAGE NOTE - ACCOMPANIED BY

## 2022-09-24 NOTE — BRIEF OPERATIVE NOTE - ELECTIVE PROCEDURE
No

## 2024-10-11 NOTE — ED ADULT TRIAGE NOTE - WEIGHT IN LBS
I have reviewed discharge instructions with the patient.  The patient verbalized understanding.    149.9